# Patient Record
Sex: MALE | Race: WHITE | NOT HISPANIC OR LATINO | URBAN - METROPOLITAN AREA
[De-identification: names, ages, dates, MRNs, and addresses within clinical notes are randomized per-mention and may not be internally consistent; named-entity substitution may affect disease eponyms.]

---

## 2024-03-12 ENCOUNTER — OFFICE (OUTPATIENT)
Dept: URBAN - METROPOLITAN AREA CLINIC 67 | Facility: CLINIC | Age: 44
End: 2024-03-12

## 2024-03-12 DIAGNOSIS — R10.13 EPIGASTRIC PAIN: ICD-10-CM

## 2024-03-12 DIAGNOSIS — R19.7 DIARRHEA, UNSPECIFIED: ICD-10-CM

## 2024-03-12 DIAGNOSIS — R68.81 EARLY SATIETY: ICD-10-CM

## 2024-03-12 DIAGNOSIS — R63.4 ABNORMAL WEIGHT LOSS: ICD-10-CM

## 2024-03-12 DIAGNOSIS — K92.1 MELENA: ICD-10-CM

## 2024-03-12 PROCEDURE — 99204 OFFICE O/P NEW MOD 45 MIN: CPT | Performed by: INTERNAL MEDICINE

## 2024-03-12 NOTE — SERVICEHPINOTES
Yonis  Gaby   is seen for an initial visit today.     
br43-year-old male establishing care for multiple abdominal symptoms.brInitial complaint is regarding epigastric discomfort going on for the past 2 months. He experiences this most days and seems to be only after eating, seems to be independent of food type. Experiences some early satiety symptoms and nausea as well but no vomiting. Has tried eliminating gluten from diet but has not found that this has been helpful. He does suspect he may have some lactose intolerance.brHe also has chronic changes in bowel habits describes having at least 3-4 bowel movements in the morning that are loose and urgent, and sometimes has more in the afternoon depending on what he eats. He does have food aversion when out in public as he is nervous that he would have diarrhea or stool urgency in public. Has noticed only bright red blood on toilet paper, never in stool.
br Has lost 35 lbs intentionally over past year.Has been put on pantoprazole 40 mg only use it for the past 6 days, possibly has had mild improvement in abdominal pain.brHas significant increased anxiety and emotional stressors, works as a songwriter.brFamily history notable for father with stomach cancer.brReviewed labs from primary care physician drawn on 3/5 notable for normal CMP, CBC, TSH, A1c, celiac panel, CRP.

## 2024-03-12 NOTE — SERVICENOTES
Try starting with fiber supplementation as instructed for at least two weeks. If no improvement, can stop fiber and move to Lactose Free Diet as instructed. If only partial improvement with fiber, can continue fiber and proceed with Lactose Free Diet for two weeks.

## 2024-04-02 ENCOUNTER — AMBULATORY SURGICAL CENTER (OUTPATIENT)
Dept: URBAN - METROPOLITAN AREA SURGERY 19 | Facility: SURGERY | Age: 44
End: 2024-04-02
Payer: COMMERCIAL

## 2024-04-02 ENCOUNTER — OFFICE (OUTPATIENT)
Dept: URBAN - METROPOLITAN AREA PATHOLOGY 10 | Facility: PATHOLOGY | Age: 44
End: 2024-04-02
Payer: COMMERCIAL

## 2024-04-02 DIAGNOSIS — K29.50 UNSPECIFIED CHRONIC GASTRITIS WITHOUT BLEEDING: ICD-10-CM

## 2024-04-02 DIAGNOSIS — R19.7 DIARRHEA, UNSPECIFIED: ICD-10-CM

## 2024-04-02 DIAGNOSIS — K92.1 MELENA: ICD-10-CM

## 2024-04-02 PROCEDURE — 45380 COLONOSCOPY AND BIOPSY: CPT | Performed by: INTERNAL MEDICINE

## 2024-04-02 PROCEDURE — 88313 SPECIAL STAINS GROUP 2: CPT | Performed by: STUDENT IN AN ORGANIZED HEALTH CARE EDUCATION/TRAINING PROGRAM

## 2024-04-02 PROCEDURE — 88305 TISSUE EXAM BY PATHOLOGIST: CPT | Performed by: STUDENT IN AN ORGANIZED HEALTH CARE EDUCATION/TRAINING PROGRAM

## 2024-04-02 PROCEDURE — 88342 IMHCHEM/IMCYTCHM 1ST ANTB: CPT | Mod: 59 | Performed by: STUDENT IN AN ORGANIZED HEALTH CARE EDUCATION/TRAINING PROGRAM

## 2024-04-02 PROCEDURE — 43239 EGD BIOPSY SINGLE/MULTIPLE: CPT | Mod: 51 | Performed by: INTERNAL MEDICINE
